# Patient Record
Sex: MALE | Race: WHITE | Employment: FULL TIME | URBAN - NONMETROPOLITAN AREA
[De-identification: names, ages, dates, MRNs, and addresses within clinical notes are randomized per-mention and may not be internally consistent; named-entity substitution may affect disease eponyms.]

---

## 2018-02-05 ENCOUNTER — COMMUNICATION - GICH (OUTPATIENT)
Dept: FAMILY MEDICINE | Facility: OTHER | Age: 33
End: 2018-02-05

## 2018-02-05 DIAGNOSIS — Z20.828 CONTACT WITH AND (SUSPECTED) EXPOSURE TO OTHER VIRAL COMMUNICABLE DISEASES: ICD-10-CM

## 2018-02-13 NOTE — TELEPHONE ENCOUNTER
Patient Information     Patient Name MRN Sex Willy Velazquez [id number] Male 1985      Telephone Encounter by Alayna Caldwell MD at 2018  4:56 PM     Author:  Alayna Caldwell MD Service:  (none) Author Type:  Physician     Filed:  2018  4:57 PM Encounter Date:  2018 Status:  Signed     :  Alayna Caldwell MD (Physician)            Daughter with influenza A.  proph tamiflu sent to [facility] for dad.  Alayna Caldwell MD ....................  2018   4:57 PM

## 2018-02-19 ENCOUNTER — DOCUMENTATION ONLY (OUTPATIENT)
Dept: FAMILY MEDICINE | Facility: OTHER | Age: 33
End: 2018-02-19

## 2019-09-03 ENCOUNTER — OFFICE VISIT (OUTPATIENT)
Dept: FAMILY MEDICINE | Facility: OTHER | Age: 34
End: 2019-09-03
Attending: PHYSICIAN ASSISTANT
Payer: COMMERCIAL

## 2019-09-03 VITALS
DIASTOLIC BLOOD PRESSURE: 64 MMHG | HEART RATE: 63 BPM | TEMPERATURE: 98.5 F | HEIGHT: 67 IN | WEIGHT: 201.1 LBS | RESPIRATION RATE: 16 BRPM | BODY MASS INDEX: 31.56 KG/M2 | OXYGEN SATURATION: 94 % | SYSTOLIC BLOOD PRESSURE: 110 MMHG

## 2019-09-03 DIAGNOSIS — L08.9 ABRASION, LEG W/ INFECTION, RIGHT, INITIAL ENCOUNTER: Primary | ICD-10-CM

## 2019-09-03 DIAGNOSIS — S80.811A ABRASION, LEG W/ INFECTION, RIGHT, INITIAL ENCOUNTER: Primary | ICD-10-CM

## 2019-09-03 PROCEDURE — 90471 IMMUNIZATION ADMIN: CPT | Performed by: PHYSICIAN ASSISTANT

## 2019-09-03 PROCEDURE — 90715 TDAP VACCINE 7 YRS/> IM: CPT | Performed by: PHYSICIAN ASSISTANT

## 2019-09-03 PROCEDURE — 99214 OFFICE O/P EST MOD 30 MIN: CPT | Mod: 25 | Performed by: PHYSICIAN ASSISTANT

## 2019-09-03 RX ORDER — CEPHALEXIN 500 MG/1
1000 CAPSULE ORAL 2 TIMES DAILY
Qty: 28 CAPSULE | Refills: 0 | Status: SHIPPED | OUTPATIENT
Start: 2019-09-03 | End: 2019-09-16

## 2019-09-03 RX ORDER — MUPIROCIN 20 MG/G
OINTMENT TOPICAL 3 TIMES DAILY
Qty: 15 G | Refills: 0 | Status: SHIPPED | OUTPATIENT
Start: 2019-09-03 | End: 2019-09-16

## 2019-09-03 ASSESSMENT — PAIN SCALES - GENERAL: PAINLEVEL: SEVERE PAIN (6)

## 2019-09-03 ASSESSMENT — MIFFLIN-ST. JEOR: SCORE: 1820.81

## 2019-09-03 NOTE — PROGRESS NOTES
"HPI:    Willy Courtney is a 34 year old male who presents to clinic today for evaluation of an abrasion to his right anterior leg  He was playing kick ball and he slid into second base causing a large abrasion on his anterior right leg. The wound is tender and has been applying antibiotic ointment daily. [place]   Onset 4 days ago, course is worse in past 24 hours.   Associated symptoms: swelling, redness, pain 6/10, warmth  He is able to ambulate without limping.       History reviewed. No pertinent past medical history.      No current outpatient medications on file.       Allergies   Allergen Reactions     Amoxicillin Unknown     Patient is unable to remember       ROS:  ROS  General: feels well, no fever  Musculoskeletal: injury per HPI      EXAM:  Vitals:    09/03/19 1052   BP: 110/64   BP Location: Left arm   Patient Position: Sitting   Cuff Size: Adult Regular   Pulse: 63   Resp: 16   Temp: 98.5  F (36.9  C)   TempSrc: Tympanic   SpO2: 94%   Weight: 91.2 kg (201 lb 1.6 oz)   Height: 1.702 m (5' 7\")     General appearance: well appearing male, in no acute distress  Musculoskeletal: Abrasion to right anterior leg measures 18 cm tall x 6 cm wide. There is 1 cm surrounding erythema, the leg is mildly swollen and warm when compared to left leg. Abraision is scabbed over, is dry with no drainage. No noted foreign body in wound.   Psychological: normal affect, alert and pleasant      ASSESSMENT AND PLAN:    1. Abrasion, leg w/ infection, right, initial encounter      Tetanus updated today  Abrasion to right leg with infection  Wash daily with warm soapy water and apply topical antibiotic ointment 3 times daily.   Start cephalexin 500 mg oral tablet, take 2 tablets twice daily for 7 days.   Do not soak in tub, lake, pool. Can shower per normal.   Return to clinic is symptoms persist or worsen  Patient received verbal and written instruction including review of warning " signs    Tyaler Garcia PA-C on 9/3/2019 at 1:01 PM

## 2019-09-03 NOTE — PATIENT INSTRUCTIONS
Abrasion to right leg with infection  Wash daily with warm soapy water and apply topical antibiotic ointment 3 times daily.   Start cephalexin 500 mg oral tablet, take 2 tablets twice daily for 7 days.   Follow up in clinic for rapid increase in redness, fever > 100.4, increased pain or no improvement after 48-72 hours.       Patient Education     Abrasions  Abrasions are skin scrapes. Their treatment depends on how large and deep the abrasion is.  Home care  You may be prescribed an antibiotic cream or ointment to apply to the wound. This helps prevent infection. Follow instructions when using this medicine.  General care    To care for the abrasion, do the following each day for as long as directed by your healthcare provider.  ? If you were given a bandage, change it once a day. If your bandage sticks to the wound, soak it in warm water until it loosens.  ? Wash the area with soap and warm water. You may do this in a sink or under a tub faucet or shower. Rinse off the soap. Then pat the area dry with a clean towel.  ? If antibiotic ointment or cream was prescribed, reapply it to the wound as directed. Cover the wound with a fresh nonstick bandage. If the bandage becomes wet or dirty, change it as soon as possible.  ? Some antibiotic ointments or cream can cause an allergic reaction or dermatitis. This may cause redness, itching and or hives. If this occurs, stop using the ointment immediately and wash off any remaining ointment. You may need to take some allergy medicine to relieve symptoms.    You may use acetaminophen or ibuprofen to control pain unless another pain medicine was prescribed. Talk with your healthcare provider before using these medicines if you have chronic liver or kidney disease or ever had a stomach ulcer or GI bleeding. Don t use ibuprofen in children younger than six months old.    Most skin wounds heal within 10 days. But an infection may occur even with treatment. So it s important to  watch the wound for signs of infection as listed below.  Follow-up care  Follow up with your healthcare provider, or as advised.  When to seek medical advice  Call your healthcare provider right away if any of these occur:    Fever of 100.4 F (38 C) or higher, or as directed by your healthcare provider    Increasing pain, redness, swelling, or drainage from the wound    Bleeding from the wound that does not stop after a few minutes of steady, firm pressure    Decreased ability to move any body part near the wound  Date Last Reviewed: 3/3/2017    0565-3270 [employer]. [address] [place], [place] [id number]. All rights reserved. This information is not intended as a substitute for professional medical care. Always follow your healthcare professional's instructions.

## 2019-09-03 NOTE — NURSING NOTE
"Chief Complaint   Patient presents with     Leg Pain     Pt states he slid into 2nd base on saturday, scraping his right calf.       Initial /64 (BP Location: Left arm, Patient Position: Sitting, Cuff Size: Adult Regular)   Pulse 63   Temp 98.5  F (36.9  C) (Tympanic)   Resp 16   Ht 1.702 m (5' 7\")   Wt 91.2 kg (201 lb 1.6 oz)   SpO2 94%   BMI 31.50 kg/m   Estimated body mass index is 31.5 kg/m  as calculated from the following:    Height as of this encounter: 1.702 m (5' 7\").    Weight as of this encounter: 91.2 kg (201 lb 1.6 oz).  Medication Reconciliation: complete    Jessica Aguilera LPN on 9/3/2019 at 10:55 AM    "

## 2019-09-16 ENCOUNTER — OFFICE VISIT (OUTPATIENT)
Dept: FAMILY MEDICINE | Facility: OTHER | Age: 34
End: 2019-09-16
Attending: NURSE PRACTITIONER
Payer: COMMERCIAL

## 2019-09-16 VITALS
BODY MASS INDEX: 31.88 KG/M2 | SYSTOLIC BLOOD PRESSURE: 126 MMHG | OXYGEN SATURATION: 97 % | DIASTOLIC BLOOD PRESSURE: 82 MMHG | RESPIRATION RATE: 16 BRPM | WEIGHT: 203.1 LBS | HEART RATE: 58 BPM | HEIGHT: 67 IN | TEMPERATURE: 97.7 F

## 2019-09-16 DIAGNOSIS — T63.441A BEE STING REACTION, ACCIDENTAL OR UNINTENTIONAL, INITIAL ENCOUNTER: Primary | ICD-10-CM

## 2019-09-16 PROCEDURE — 25000128 H RX IP 250 OP 636: Performed by: PHYSICIAN ASSISTANT

## 2019-09-16 PROCEDURE — 96372 THER/PROPH/DIAG INJ SC/IM: CPT

## 2019-09-16 PROCEDURE — 99214 OFFICE O/P EST MOD 30 MIN: CPT | Performed by: PHYSICIAN ASSISTANT

## 2019-09-16 RX ORDER — TRIAMCINOLONE ACETONIDE 40 MG/ML
60 INJECTION, SUSPENSION INTRA-ARTICULAR; INTRAMUSCULAR ONCE
Status: COMPLETED | OUTPATIENT
Start: 2019-09-16 | End: 2019-09-16

## 2019-09-16 RX ORDER — PREDNISONE 20 MG/1
40 TABLET ORAL DAILY
Qty: 8 TABLET | Refills: 0 | Status: SHIPPED | OUTPATIENT
Start: 2019-09-17 | End: 2019-09-21

## 2019-09-16 RX ADMIN — TRIAMCINOLONE ACETONIDE 60 MG: 40 INJECTION, SUSPENSION INTRA-ARTICULAR; INTRAMUSCULAR at 13:04

## 2019-09-16 ASSESSMENT — MIFFLIN-ST. JEOR: SCORE: 1829.89

## 2019-09-16 ASSESSMENT — PAIN SCALES - GENERAL: PAINLEVEL: MILD PAIN (2)

## 2019-09-16 NOTE — PATIENT INSTRUCTIONS
Bee sting with large local reaction on right hand  Kenalog 60 mg IM given in clinic  Start prednisone 20 mg oral tablet, take 2 tablets in AM with food for 4 days. Start tomorrow  Symptomatic treatments   Rest hand, elevate, apply ice 10-20 minutes every 1-2 hours  OTC benadryl 25-50 mg every 4-6 hours, max 300 mg/day alternately you can use cetirizine 10 mg oral tablet take 1-2 tablets daily.   For itching - apply ice, baking soda paste to bee sting site, calamine lotion  Apply topical antibiotic ointment 3 times daily for 7-10 days  Return to clinic for rapid change in symptoms - erythema, swelling, redness, fever > 100.4  Seek immediate care for    Spreading areas of itching, redness or swelling    Headache, fever, chills, muscle or joint aching    Increased pain or swelling    Signs of infection of the affected area:  ? Spreading redness  ? Increase in pain or swelling  ? Fluid or colored drainage from the affected site    Patient Education     Insect Sting Allergy, Generalized  You are having an allergic reaction to an insect sting. This may occur after a sting by a wasp, honeybee, yellow jacket, or other insect. This may cause an itchy rash and swelling in the face or other parts of the body. A more severe reaction may cause you to feel dizzy, faint, or have trouble breathing or swallowing. Other warning signs are listed below.  Symptoms can include:    Rash, hives, redness, welts, or blisters in areas other than the sting site    Itching, burning, stinging, pain in areas other than the sting site    Dry, flaky, cracking, scaly skin    Swelling in areas other than the sting site     Stomach pain or cramps  More severe symptoms include:    Swelling of the face or lips or drooling    Trouble swallowing, feeling like your throat is closing    Trouble breathing, wheezing    Dizziness or a sudden decrease in blood pressure    Hoarse voice or trouble speaking    Severe nausea, vomiting, or diarrhea    Feeling faint  or lightheaded    Rapid heart rate  Home care  Medicine  The healthcare provider may prescribe medicines to relieve swelling, itching, and pain. Follow the provider s instructions when taking these medicines.    If you had a severe reaction, the provider may prescribe an injectable epinephrine kit. Epinephrine will stop the progression of an allergic reaction. Before you leave the hospital, be sure that you understand when and how to use this medicine.    Oral diphenhydramine is an over-the-counter antihistamine available at pharmacies and grocery stores. Unless a prescription antihistamine was given, diphenhydramine may be used to reduce itching if large areas of the skin are involved. It may make you sleepy, so be careful using it in the daytime or when going to school, working, or driving. Note: Don t use diphenhydramine if you have glaucoma or if you are a man with trouble urinating due to an enlarged prostate. There are other antihistamines that cause less drowsiness and are good choices for daytime use. Ask your pharmacist for suggestions.    Don t use diphenhydramine cream on your skin. It can cause a further reaction in some people.    Calamine lotion or oatmeal baths sometimes help with itching.    You may use acetaminophen or ibuprofen to control pain, unless another pain medicine was prescribed. Note: If you have chronic liver or kidney disease or ever had a stomach ulcer or gastrointestinal bleeding, talk with your provider before using these medicines.    General care  Avoid tight clothing and things that heat up your skin (such as hot showers or baths, or direct sunlight). Heat makes the itching worse.  An ice pack will relieve local areas of intense itching and redness. Apply 5 to 10 minutes. To make an ice pack, put ice cubes in a plastic bag that seals at the top. Wrap the bag in a clean, thin towel or cloth. Don t put ice directly on the skin.  Ticks  If you try to remove a tick, do the  following:    Use a set of fine tweezers and  the tick as close to the skin as is possible.    Pull upwards, using even, steady pressure. Don t jerk or twist the tick. The tick s bodily fluids may contain infection-causing organisms. So don t squeeze, crush, or puncture the body of the tick. Don t use a smoldering match or cigarette, nail polish, petroleum jelly, liquid soap, or kerosene. They may irritate the tick.    If any mouthparts of the tick remain in the skin, these can be removed with tweezers. If you can t remove the mouth (of a tick) easily with clean tweezers, leave it alone and let the skin heal.    After the tick is removed, wash the bite area with rubbing alcohol, iodine, or soap and water.    Put the tick in a sealed container and completely cover it with alcohol. Never try to kill or crush a tick with your hand or fingers.  Stings  Wasps, yellow jackets, and hornets don t leave a stinger behind. But if a honeybee stings you, a stinger may stay in your skin. The stinger of a honeybee releases a substance that will attract other bees to you. So try to move away from the nest immediately. Once you are away from the nest, then remove the stinger as quickly as possible by:    Scraping the stinger out with the edge of a dull knife or plastic card (credit card).    Don't use a tweezer or your fingers to remove the stinger since that may squeeze more toxin from the stinger.    Wash the affected area with soap and warm water 2 to 3 times a day. Don't break a blister, if present.    Next apply an ice pack for 5 to 10 minutes. To make an ice pack, put ice cubes in a plastic bag that seals at the top. Wrap the bag in a clean, thin towel or cloth. Don t put ice directly on the skin.    Contact your healthcare provider and ask what can be used to help decrease the swelling and itching to the affected area.     To prevent an infection, don't scratch the affected areas. Always check the sting area for signs of  an infection: increased redness, swelling, or pain to the affected area.  Preventing future reactions  Future reactions could be worse than this one. So try to avoid situations where you might be stung:    Don't walk in grass without shoes. Avoid wearing sandals.    Don't leave food uncovered when eating outside. Sweet treats, watermelon, and ice cream attract insects.    Don't drink from uncovered sweetened drinks in cans when outside. Insects are attracted to soda drink cans and sometimes crawl inside of them.    Don't wear bright colored clothes with flowery prints and patterns when outside.    Don t wear perfume when outside. Smell attracts insects.    Wear long pants, long-sleeved shirts, socks, and work gloves when working outside.    Be aware that honeybees nest in trees. Wasps and yellow jackets nest in the ground, trees or roof eaves. Avoid garbage cans when outside.  Auto-injectable epinephrine    If you are at high risk for another sting due to where you work or play, or if your reaction included dizziness, fainting or trouble breathing or swallowing, an auto-injectable epinephrine may be prescribed. If not, ask your healthcare provider for one and always carry it with you. Learn how to use the device. If you begin to feel the symptoms of another reaction in the future, use the auto-injectable epinephrine to inject yourself, and then call 911. Don't wait until symptoms become severe.     Remember that the auto-injectable epinephrine is a rescue medicine only. You still need someone to take you to the hospital or call 911 after you have received the medicine.  Follow-up care  Follow up with your healthcare provider, or as advised if your symptoms do not continue to improve.  Call 911  Call 911 if any of these occur:    Trouble breathing or swallowing, wheezing     Cool, moist, pale skin    Hoarse voice or trouble speaking    Confused    Very drowsy or trouble waking up    Fainting or loss of  consciousness    Rapid heart rate    Low blood pressure or feeling dizzy or weak    Feeling of doom    Severe nausea, vomiting, or diarrhea    Seizure    Swelling in the face, eyelids, lips, mouth, throat or tongue    Drooling  When to seek medical advice  Call your healthcare provider right away if any of the following occur:    Spreading areas of itching, redness or swelling    Headache, fever, chills, muscle or joint aching    Increased pain or swelling    Signs of infection of the affected area:  ? Spreading redness  ? Increase in pain or swelling  ? Fluid or colored drainage from the affected site  Date Last Reviewed: 3/1/2017    5895-1884 [employer]. [address] [place] [address]. All rights reserved. This information is not intended as a substitute for professional medical care. Always follow your healthcare professional's instructions.

## 2019-09-16 NOTE — PROGRESS NOTES
"HPI:    Willy Courtney is a 34 year old male who presents to clinic today for evaluation of a bee sting to his right hand  Onset yesterday afternoon, course is gradually worsening  Associated symptoms: bee sting between 2/3 fingers on right hand, erythema, swelling, mild warmth over 2/3 fingers and dorsal hand    Treatments ice, benadryl 25 mg every 4 hours, he has taken about 6 doses. Last dose was 4 hours PTA    No facial swelling, difficulty with swallowing, no chest tightness, wheezing, shortness of breath or difficulty with breathing  No fever, no headache, is otherwise feeling well    History reviewed. No pertinent past medical history.      No current outpatient medications on file.       Allergies   Allergen Reactions     Amoxicillin Unknown     Patient is unable to remember     Bactroban [Mupirocin]        ROS:  General: feels well, no fever  Skin: POSITIVE bee sting  HENT: negative   Respiratory: negative  Abdomen: negative  Musculoskleletal: negative  Neurological: negative    EXAM:  Vitals:    09/16/19 1231   BP: 126/82   BP Location: Left arm   Patient Position: Sitting   Cuff Size: Adult Regular   Pulse: 58   Resp: 16   Temp: 97.7  F (36.5  C)   TempSrc: Tympanic   SpO2: 97%   Weight: 92.1 kg (203 lb 1.6 oz)   Height: 1.702 m (5' 7\")     General appearance: well appearing male, in no acute distress  Head: normocephalic, atraumatic  Ears: ear canals normal  Eyes: conjunctivae normal  Orophayrnx: no swelling, moist mucous membranes, no erythema  Neck: supple without adenopathy  Respiratory: normal respiration  Cardiac: RRR with no murmurs  Musculoskeletal: right hand has bee sting noted between 2/3 fingers, erythema over prox phalanx of both fingers and over dorsal hand, with swelling. Mild warmth.   Dermatological: no other rash  Psychological: normal affect, alert and pleasant    ASSESSMENT AND PLAN:    1. Bee sting reaction, accidental or unintentional, initial encounter      Bee sting with large local " reaction on right hand  Kenalog 60 mg IM given in clinic  Start prednisone 20 mg oral tablet, take 2 tablets in AM with food for 4 days. Start tomorrow  Symptomatic treatments   Rest hand, elevate, apply ice 10-20 minutes every 1-2 hours  OTC benadryl 25-50 mg every 4-6 hours, max 300 mg/day alternately you can use cetirizine 10 mg oral tablet take 1-2 tablets daily.   For itching - apply ice, baking soda paste to bee sting site, calamine lotion  Apply topical antibiotic ointment 3 times daily for 7-10 days  Return to clinic for rapid change in symptoms - erythema, swelling, redness, fever > 100.4  Patient received verbal and written instruction including review of warning signs    Tayler Garcia PA-C on 9/16/2019 at 2:21 PM

## 2019-09-16 NOTE — NURSING NOTE
"Chief Complaint   Patient presents with     Insect Bites     Pt states he was stung by a bee yesterday in the right hand and has been taking benadryl Q4H and can't get the swelling to go down.       Initial /82 (BP Location: Left arm, Patient Position: Sitting, Cuff Size: Adult Regular)   Pulse 58   Temp 97.7  F (36.5  C) (Tympanic)   Resp 16   Ht 1.702 m (5' 7\")   Wt 92.1 kg (203 lb 1.6 oz)   SpO2 97%   BMI 31.81 kg/m   Estimated body mass index is 31.81 kg/m  as calculated from the following:    Height as of this encounter: 1.702 m (5' 7\").    Weight as of this encounter: 92.1 kg (203 lb 1.6 oz).  Medication Reconciliation: complete    Jessica Aguilera LPN on 9/16/2019 at 12:34 PM    "

## 2024-01-02 ENCOUNTER — OFFICE VISIT (OUTPATIENT)
Dept: FAMILY MEDICINE | Facility: OTHER | Age: 39
End: 2024-01-02
Payer: COMMERCIAL

## 2024-01-02 VITALS
RESPIRATION RATE: 14 BRPM | BODY MASS INDEX: 29.24 KG/M2 | TEMPERATURE: 97.9 F | HEART RATE: 67 BPM | DIASTOLIC BLOOD PRESSURE: 72 MMHG | OXYGEN SATURATION: 98 % | WEIGHT: 186.3 LBS | HEIGHT: 67 IN | SYSTOLIC BLOOD PRESSURE: 118 MMHG

## 2024-01-02 DIAGNOSIS — J02.9 SORE THROAT: Primary | ICD-10-CM

## 2024-01-02 DIAGNOSIS — J02.0 STREPTOCOCCAL PHARYNGITIS: ICD-10-CM

## 2024-01-02 LAB — GROUP A STREP BY PCR: DETECTED

## 2024-01-02 PROCEDURE — 99213 OFFICE O/P EST LOW 20 MIN: CPT | Performed by: NURSE PRACTITIONER

## 2024-01-02 PROCEDURE — 87651 STREP A DNA AMP PROBE: CPT | Mod: ZL | Performed by: NURSE PRACTITIONER

## 2024-01-02 RX ORDER — CEPHALEXIN 500 MG/1
500 CAPSULE ORAL 2 TIMES DAILY
Qty: 20 CAPSULE | Refills: 0 | Status: SHIPPED | OUTPATIENT
Start: 2024-01-02 | End: 2024-01-12

## 2024-01-02 ASSESSMENT — PAIN SCALES - GENERAL: PAINLEVEL: SEVERE PAIN (7)

## 2024-01-02 NOTE — NURSING NOTE
"Chief Complaint   Patient presents with    Pharyngitis     Symptoms started Saturday; negative covid test at home; taking Ibuprofen and Tylenol     Generalized Body Aches    Headache       Initial /72   Pulse 67   Temp 97.9  F (36.6  C) (Tympanic)   Resp 14   Ht 1.708 m (5' 7.25\")   Wt 84.5 kg (186 lb 4.8 oz)   SpO2 98%   BMI 28.96 kg/m   Estimated body mass index is 28.96 kg/m  as calculated from the following:    Height as of this encounter: 1.708 m (5' 7.25\").    Weight as of this encounter: 84.5 kg (186 lb 4.8 oz).  Medication Review: complete    The next two questions are to help us understand your food security.  If you are feeling you need any assistance in this area, we have resources available to support you today.          1/2/2024   SDOH- Food Insecurity   Within the past 12 months, did you worry that your food would run out before you got money to buy more? N   Within the past 12 months, did the food you bought just not last and you didn t have money to get more? N         Health Care Directive:  Patient does not have a Health Care Directive or Living Will: Discussed advance care planning with patient; however, patient declined at this time.    Radha Gamez CMA      "

## 2024-01-02 NOTE — PROGRESS NOTES
ASSESSMENT/PLAN:    I have reviewed the nursing notes.  I have reviewed the findings, diagnosis, plan and need for follow up with the patient.    1. Sore throat  - Group A Streptococcus PCR Throat Swab  Positive strep test.  Treating with cephalexin as patient has an allergy to amoxicillin.  Recommend new toothbrush in 2 days prevent reinfection and take full course of antibiotic as it was prescribed.    2. Streptococcal pharyngitis  - cephALEXin (KEFLEX) 500 MG capsule; Take 1 capsule (500 mg) by mouth 2 times daily for 10 days  Dispense: 20 capsule; Refill: 0    - Symptomatic treatment - Encouraged fluids, salt water gargles, honey (only if greater than 1 year in age due to risk of botulism), elevation, humidifier, sinus rinse/netti pot, lozenges, tea, topical vapor rub, popsicles, rest, etc   -May use over-the-counter Tylenol or ibuprofen PRN    Follow up if symptoms persist or worsen or concerns    I explained my diagnostic considerations and recommendations to the patient, who voiced understanding and agreement with the treatment plan. All questions were answered. We discussed potential side effects of any prescribed or recommended therapies, as well as expectations for response to treatments.    Radha Pastrana NP  1/2/2024  10:26 AM    HPI:  Willy Courtney is a 38 year old male who [facility] for concerns of sore throat, negative home covid test. Taking ibuprofen and tylenol. Generalized body aches, and headache also present. Throat still really sore but no headaches anymore. Had fever/chills in the night. Wife sick too. Had white stuff in back of throat. No shortness of breath. Saturday night is when symptoms started.     No past medical history on file.  No past surgical history on file.  Social History     Tobacco Use    Smoking status: Never    Smokeless tobacco: Former     Types: Chew     Quit date: 1/1/2011   Substance Use Topics    Alcohol use: Yes     Comment: Alcoholic Drinks/day:  "moderate     Current Outpatient Medications   Medication Sig Dispense Refill    cephALEXin (KEFLEX) 500 MG capsule Take 1 capsule (500 mg) by mouth 2 times daily for 10 days 20 capsule 0     Allergies   Allergen Reactions    Amoxicillin Unknown     Patient is unable to remember    Bactroban [Mupirocin]      Past medical history, past surgical history, current medications and allergies reviewed and accurate to the best of my knowledge.      ROS:  Refer to HPI    /72   Pulse 67   Temp 97.9  F (36.6  C) (Tympanic)   Resp 14   Ht 1.708 m (5' 7.25\")   Wt 84.5 kg (186 lb 4.8 oz)   SpO2 98%   BMI 28.96 kg/m      EXAM:  General Appearance: Well appearing 38 year old male, appropriate appearance for age. No acute distress   Ears: Left TM intact, translucent with bony landmarks appreciated, no erythema, no effusion, no bulging, no purulence.  Right TM intact, translucent with bony landmarks appreciated, no erythema, no effusion, no bulging, no purulence.  Left auditory canal clear.  Right auditory canal clear.  Normal external ears, non tender.  Eyes: conjunctivae normal without erythema or irritation, corneas clear, no drainage or crusting, no eyelid swelling, pupils equal   Oropharynx: moist mucous membranes, + posterior pharynx with erythema, tonsils 2+, no erythema, + bilateral tonsillar exudates, no post nasal drip seen, no trismus, voice clear.    Nose:  Bilateral nares: no erythema, no edema, no drainage or congestion   Neck: supple without adenopathy  Respiratory: normal chest wall and respirations.  Normal effort.  Clear to auscultation bilaterally, no wheezing, crackles or rhonchi.  No increased work of breathing.  No cough appreciated.  Cardiac: RRR with no murmurs  Musculoskeletal:  Equal movement of bilateral upper extremities.  Equal movement of bilateral lower extremities.  Normal gait.    Neuro: Alert and oriented to person, place, and time.    Psychological: normal affect, alert, oriented, and " pleasant.     Results for orders placed or performed in visit on 01/02/24   Group A Streptococcus PCR Throat Swab     Status: Abnormal    Specimen: Throat; Swab   Result Value Ref Range    Group A strep by PCR Detected (A) Not Detected    Narrative    The Xpert Xpress Strep A test, performed on the [employer], is a rapid, qualitative in vitro diagnostic test for the detection of Streptococcus pyogenes (Group A ß-hemolytic Streptococcus, Strep A) in throat swab specimens from patients with signs and symptoms of pharyngitis. The Xpert Xpress Strep A test can be used as an aid in the diagnosis of Group A Streptococcal pharyngitis. The assay is not intended to monitor treatment for Group A Streptococcus infections. The Xpert Xpress Strep A test utilizes an automated real-time polymerase chain reaction (PCR) to detect Streptococcus pyogenes DNA.

## 2024-01-02 NOTE — LETTER
January 2, 2024      Willy Courtney  [address] [address]        To Whom It May Concern:    Willy BRUNNER Roz was seen on 1/2/2024.          Sincerely,        Radha Pastrana NP

## 2024-08-12 ENCOUNTER — OFFICE VISIT (OUTPATIENT)
Dept: FAMILY MEDICINE | Facility: OTHER | Age: 39
End: 2024-08-12
Payer: COMMERCIAL

## 2024-08-12 VITALS
BODY MASS INDEX: 28.66 KG/M2 | TEMPERATURE: 98.6 F | SYSTOLIC BLOOD PRESSURE: 122 MMHG | HEIGHT: 67 IN | WEIGHT: 182.6 LBS | RESPIRATION RATE: 19 BRPM | HEART RATE: 96 BPM | OXYGEN SATURATION: 96 % | DIASTOLIC BLOOD PRESSURE: 70 MMHG

## 2024-08-12 DIAGNOSIS — R04.0 ACUTE POSTERIOR EPISTAXIS: Primary | ICD-10-CM

## 2024-08-12 DIAGNOSIS — Z79.2 PROPHYLACTIC ANTIBIOTIC: ICD-10-CM

## 2024-08-12 DIAGNOSIS — R11.0 NAUSEA: ICD-10-CM

## 2024-08-12 PROCEDURE — 250N000011 HC RX IP 250 OP 636: Performed by: NURSE PRACTITIONER

## 2024-08-12 PROCEDURE — 30901 CONTROL OF NOSEBLEED: CPT | Performed by: NURSE PRACTITIONER

## 2024-08-12 PROCEDURE — 99213 OFFICE O/P EST LOW 20 MIN: CPT | Mod: 25 | Performed by: NURSE PRACTITIONER

## 2024-08-12 PROCEDURE — 250N000009 HC RX 250: Performed by: NURSE PRACTITIONER

## 2024-08-12 RX ORDER — CEPHALEXIN 500 MG/1
500 CAPSULE ORAL 2 TIMES DAILY
Qty: 10 CAPSULE | Refills: 0 | Status: SHIPPED | OUTPATIENT
Start: 2024-08-12 | End: 2024-08-17

## 2024-08-12 RX ORDER — OXYMETAZOLINE HYDROCHLORIDE 0.05 G/100ML
2 SPRAY NASAL ONCE
Status: DISCONTINUED | OUTPATIENT
Start: 2024-08-12 | End: 2024-08-12 | Stop reason: ALTCHOICE

## 2024-08-12 RX ORDER — ONDANSETRON 4 MG/1
4 TABLET, ORALLY DISINTEGRATING ORAL ONCE
Status: COMPLETED | OUTPATIENT
Start: 2024-08-12 | End: 2024-08-12

## 2024-08-12 RX ADMIN — Medication 2 SPRAY: at 11:49

## 2024-08-12 RX ADMIN — ONDANSETRON 4 MG: 4 TABLET, ORALLY DISINTEGRATING ORAL at 11:37

## 2024-08-12 ASSESSMENT — PAIN SCALES - GENERAL: PAINLEVEL: NO PAIN (0)

## 2024-08-12 NOTE — NURSING NOTE
"Chief Complaint   Patient presents with    Nose Bleeds     X1 day, left nostril     Patient here for a nose bled out of left that started yesterday. He notes that they come every 2 hours. He is nauseous due to drainage in throat. No injury noted.       Initial /70   Pulse 96   Temp 98.6  F (37  C) (Temporal)   Resp 19   Ht 1.702 m (5' 7\")   Wt 82.8 kg (182 lb 9.6 oz)   SpO2 96%   BMI 28.60 kg/m   Estimated body mass index is 28.6 kg/m  as calculated from the following:    Height as of this encounter: 1.702 m (5' 7\").    Weight as of this encounter: 82.8 kg (182 lb 9.6 oz).  Medication Review: complete    The next two questions are to help us understand your food security.  If you are feeling you need any assistance in this area, we have resources available to support you today.          8/12/2024   SDOH- Food Insecurity   Within the past 12 months, did you worry that your food would run out before you got money to buy more? N   Within the past 12 months, did the food you bought just not last and you didn t have money to get more? N            Health Care Directive:  Patient does not have a Health Care Directive or Living Will: Discussed advance care planning with patient; however, patient declined at this time.    Mckayla Johnston LPN      "

## 2024-08-13 ENCOUNTER — HOSPITAL ENCOUNTER (EMERGENCY)
Facility: OTHER | Age: 39
Discharge: HOME | End: 2024-08-14
Payer: COMMERCIAL

## 2024-08-13 ENCOUNTER — HOSPITAL ENCOUNTER (EMERGENCY)
Facility: OTHER | Age: 39
Discharge: LEFT BEFORE DISCHARGE | End: 2024-08-13
Payer: COMMERCIAL

## 2024-08-13 ENCOUNTER — TELEPHONE (OUTPATIENT)
Dept: FAMILY MEDICINE | Facility: OTHER | Age: 39
End: 2024-08-13
Payer: COMMERCIAL

## 2024-08-13 VITALS
TEMPERATURE: 98.1 F | HEIGHT: 67 IN | WEIGHT: 182 LBS | SYSTOLIC BLOOD PRESSURE: 128 MMHG | RESPIRATION RATE: 20 BRPM | HEART RATE: 89 BPM | DIASTOLIC BLOOD PRESSURE: 93 MMHG | OXYGEN SATURATION: 99 % | BODY MASS INDEX: 28.56 KG/M2

## 2024-08-13 VITALS
TEMPERATURE: 98.2 F | DIASTOLIC BLOOD PRESSURE: 74 MMHG | HEIGHT: 67 IN | BODY MASS INDEX: 28.56 KG/M2 | WEIGHT: 182 LBS | HEART RATE: 75 BPM | SYSTOLIC BLOOD PRESSURE: 133 MMHG | OXYGEN SATURATION: 97 % | RESPIRATION RATE: 20 BRPM

## 2024-08-13 DIAGNOSIS — R04.0 EPISTAXIS: ICD-10-CM

## 2024-08-13 LAB
ANION GAP SERPL CALCULATED.3IONS-SCNC: 8 MMOL/L (ref 7–15)
APTT PPP: 23 SECONDS (ref 22–38)
BASOPHILS # BLD AUTO: 0 10E3/UL (ref 0–0.2)
BASOPHILS NFR BLD AUTO: 0 %
BUN SERPL-MCNC: 12.1 MG/DL (ref 6–20)
CALCIUM SERPL-MCNC: 9.4 MG/DL (ref 8.8–10.4)
CHLORIDE SERPL-SCNC: 103 MMOL/L (ref 98–107)
CREAT SERPL-MCNC: 0.78 MG/DL (ref 0.67–1.17)
EGFRCR SERPLBLD CKD-EPI 2021: >90 ML/MIN/1.73M2
EOSINOPHIL # BLD AUTO: 0 10E3/UL (ref 0–0.7)
EOSINOPHIL NFR BLD AUTO: 0 %
ERYTHROCYTE [DISTWIDTH] IN BLOOD BY AUTOMATED COUNT: 12.8 % (ref 10–15)
GLUCOSE SERPL-MCNC: 117 MG/DL (ref 70–99)
HCO3 SERPL-SCNC: 28 MMOL/L (ref 22–29)
HCT VFR BLD AUTO: 35.6 % (ref 40–53)
HGB BLD-MCNC: 11.9 G/DL (ref 13.3–17.7)
IMM GRANULOCYTES # BLD: 0 10E3/UL
IMM GRANULOCYTES NFR BLD: 0 %
INR PPP: 1.03 (ref 0.85–1.15)
LYMPHOCYTES # BLD AUTO: 1.8 10E3/UL (ref 0.8–5.3)
LYMPHOCYTES NFR BLD AUTO: 25 %
MCH RBC QN AUTO: 32 PG (ref 26.5–33)
MCHC RBC AUTO-ENTMCNC: 33.4 G/DL (ref 31.5–36.5)
MCV RBC AUTO: 96 FL (ref 78–100)
MONOCYTES # BLD AUTO: 0.5 10E3/UL (ref 0–1.3)
MONOCYTES NFR BLD AUTO: 7 %
NEUTROPHILS # BLD AUTO: 4.8 10E3/UL (ref 1.6–8.3)
NEUTROPHILS NFR BLD AUTO: 68 %
NRBC # BLD AUTO: 0 10E3/UL
NRBC BLD AUTO-RTO: 0 /100
PLATELET # BLD AUTO: 231 10E3/UL (ref 150–450)
POTASSIUM SERPL-SCNC: 4 MMOL/L (ref 3.4–5.3)
RBC # BLD AUTO: 3.72 10E6/UL (ref 4.4–5.9)
SODIUM SERPL-SCNC: 139 MMOL/L (ref 135–145)
WBC # BLD AUTO: 7.1 10E3/UL (ref 4–11)

## 2024-08-13 PROCEDURE — 85730 THROMBOPLASTIN TIME PARTIAL: CPT

## 2024-08-13 PROCEDURE — 85025 COMPLETE CBC W/AUTO DIFF WBC: CPT

## 2024-08-13 PROCEDURE — 85610 PROTHROMBIN TIME: CPT

## 2024-08-13 PROCEDURE — 99283 EMERGENCY DEPT VISIT LOW MDM: CPT

## 2024-08-13 PROCEDURE — 80048 BASIC METABOLIC PNL TOTAL CA: CPT

## 2024-08-13 PROCEDURE — 250N000013 HC RX MED GY IP 250 OP 250 PS 637

## 2024-08-13 PROCEDURE — 250N000009 HC RX 250

## 2024-08-13 PROCEDURE — 36415 COLL VENOUS BLD VENIPUNCTURE: CPT

## 2024-08-13 RX ORDER — LIDOCAINE HYDROCHLORIDE 20 MG/ML
JELLY TOPICAL ONCE
Status: COMPLETED | OUTPATIENT
Start: 2024-08-13 | End: 2024-08-13

## 2024-08-13 RX ADMIN — LIDOCAINE HYDROCHLORIDE: 20 JELLY TOPICAL at 22:54

## 2024-08-13 RX ADMIN — PHENYLEPHRINE HYDROCHLORIDE 1 DROP: 0.5 SPRAY NASAL at 22:54

## 2024-08-13 ASSESSMENT — ACTIVITIES OF DAILY LIVING (ADL)
ADLS_ACUITY_SCORE: 33
ADLS_ACUITY_SCORE: 35

## 2024-08-13 ASSESSMENT — COLUMBIA-SUICIDE SEVERITY RATING SCALE - C-SSRS
6. HAVE YOU EVER DONE ANYTHING, STARTED TO DO ANYTHING, OR PREPARED TO DO ANYTHING TO END YOUR LIFE?: NO
1. IN THE PAST MONTH, HAVE YOU WISHED YOU WERE DEAD OR WISHED YOU COULD GO TO SLEEP AND NOT WAKE UP?: NO
1. IN THE PAST MONTH, HAVE YOU WISHED YOU WERE DEAD OR WISHED YOU COULD GO TO SLEEP AND NOT WAKE UP?: NO
2. HAVE YOU ACTUALLY HAD ANY THOUGHTS OF KILLING YOURSELF IN THE PAST MONTH?: NO
6. HAVE YOU EVER DONE ANYTHING, STARTED TO DO ANYTHING, OR PREPARED TO DO ANYTHING TO END YOUR LIFE?: NO
2. HAVE YOU ACTUALLY HAD ANY THOUGHTS OF KILLING YOURSELF IN THE PAST MONTH?: NO

## 2024-08-13 NOTE — ED TRIAGE NOTES
"ED Nursing Triage Note (General)   ________________________________    Willy Courtney is a 39 year old Male that presents to triage via private vehicle with complaints of epistaxis.  Patient was seen in the  yesterday for prolonged nose bleed and a rhino rocket was placed in the L) nare.  Patient states he declined blood work at that time, however, states now his R) nare is intermittently bleeding and states on arrival he can feel it draining in the back of his throat.  Patient denies injury, trauma, or recent surgery.  No hx of hypertension.  Significant symptoms had onset 1.5 hour(s) ago.  Vital signs:  Temp: 98.1  F (36.7  C) Temp src: Tympanic BP: (!) 153/74 Pulse: 78   Resp: 20 SpO2: 98 %     Height: 170.2 cm (5' 7\") Weight: 82.6 kg (182 lb)  Estimated body mass index is 28.51 kg/m  as calculated from the following:    Height as of this encounter: 1.702 m (5' 7\").    Weight as of this encounter: 82.6 kg (182 lb).  PRE HOSPITAL PRIOR LIVING SITUATION Spouse and Children      Triage Assessment (Adult)       Row Name 08/13/24 7629          Triage Assessment    Airway WDL WDL        Respiratory WDL    Respiratory WDL WDL        Skin Circulation/Temperature WDL    Skin Circulation/Temperature WDL WDL        Cardiac WDL    Cardiac WDL WDL     Cardiac Rhythm NSR        Peripheral/Neurovascular WDL    Peripheral Neurovascular WDL WDL        Cognitive/Neuro/Behavioral WDL    Cognitive/Neuro/Behavioral WDL WDL                     "

## 2024-08-13 NOTE — TELEPHONE ENCOUNTER
Pt states that he was seen in the [facility] yesterday for a bloody nose.  He was given a Rhino rocket.  He is still experiencing a bloody nose and he would like to know if this is normal.    Jah Gonzalez on 8/13/2024 at 12:37 PM

## 2024-08-13 NOTE — ED TRIAGE NOTES
"ED Nursing Triage Note (General)   ________________________________    Willy Courtney is a 39 year old Male that presents to triage via private vehicle  [facility] where they inserted a rhino rocket in his L) nare.  Patient states his nose began bleeding again last night and is now on the R) side of his nose.  Patient states bleeding has been intermittent with the last episode at noon.  Patient called the  and was told to come to the ER in the instance it needs to be cauterized.  Patient denies medication changes, no injury, or recent surgeries.  Patient states he refused blood work yesterday, however, states he is now concerned for the cause of bleeding and would like blood work done.   Significant symptoms had onset 24 hour(s) ago.  Vital signs:  Temp: 98.2  F (36.8  C) Temp src: Tympanic BP: 133/74 Pulse: 75   Resp: 20 SpO2: 97 %     Height: 170.2 cm (5' 7\") Weight: 82.6 kg (182 lb)  Estimated body mass index is 28.51 kg/m  as calculated from the following:    Height as of this encounter: 1.702 m (5' 7\").    Weight as of this encounter: 82.6 kg (182 lb).  PRE HOSPITAL PRIOR LIVING SITUATION Spouse      Triage Assessment (Adult)       Row Name 08/13/24 1331          Triage Assessment    Airway WDL WDL        Respiratory WDL    Respiratory WDL WDL        Skin Circulation/Temperature WDL    Skin Circulation/Temperature WDL WDL        Cardiac WDL    Cardiac WDL WDL     Cardiac Rhythm NSR        Peripheral/Neurovascular WDL    Peripheral Neurovascular WDL WDL        Cognitive/Neuro/Behavioral WDL    Cognitive/Neuro/Behavioral WDL WDL                     "

## 2024-08-13 NOTE — TELEPHONE ENCOUNTER
Patient was informed of provider's recommendations.     Mckayla Johnston LPN on 8/13/2024 at 1:07 PM

## 2024-08-14 ASSESSMENT — ACTIVITIES OF DAILY LIVING (ADL): ADLS_ACUITY_SCORE: 35

## 2024-08-14 NOTE — ED PROVIDER NOTES
"  History     Chief Complaint   Patient presents with    Epistaxis     HPI  Willy Courtney is a 39 year old male with a bloody nose.  He reports on and off bloody nose since Wednesday.  He reports he was running Wednesday when he got a bloody nose from his left nostril.  He reports Saturday while mowing his lawn he got another bloody nose.  Sunday the bleeding was prolonged and worsened into Monday.  [facility] and a Rhino Rocket was placed in the left nare.  He is on a prophylactic antibiotic.  He reports the Rhino Rocket is not working as he is bleeding from the right nostril. He can feel blood running down his throat. He is spitting out the blood that is running down the back of his throat.  He denies any recent head or nasal trauma.  He started taking an herbal supplement called Beberine 1000mg two weeks ago.  His daughter is currently sick with an upper respiratory infection.     Allergies:  Allergies   Allergen Reactions    Amoxicillin Unknown     Patient is unable to remember    Bactroban [Mupirocin]        Problem List:    There are no problems to display for this patient.       Past Medical History:    No past medical history on file.    Past Surgical History:    No past surgical history on file.    Family History:    No family history on file.    Social History:  Marital Status:   [2]  Social History     Tobacco Use    Smoking status: Never    Smokeless tobacco: Former     Types: Chew     Quit date: 1/1/2011   Vaping Use    Vaping status: Never Used   Substance Use Topics    Alcohol use: Yes     Comment: Alcoholic Drinks/day: moderate    Drug use: Never        Medications:    cephALEXin (KEFLEX) 500 MG capsule        Review of Systems   HENT:  Positive for nosebleeds.        Physical Exam   BP: (!) 153/74  Pulse: 78  Temp: 98.1  F (36.7  C)  Resp: 20  Height: 170.2 cm (5' 7\")  Weight: 82.6 kg (182 lb)  SpO2: 98 %      Physical Exam  Vitals and nursing note reviewed. "   Constitutional:       General: He is not in acute distress.  HENT:      Head: Normocephalic.      Nose:      Right Nostril: Epistaxis present.      Comments: Rhino rocket in left nare. Slow oozing noted coming from left nare. Intermittent oozing coming from right nare.      Mouth/Throat:      Lips: Pink.      Mouth: Mucous membranes are moist.   Cardiovascular:      Rate and Rhythm: Normal rate and regular rhythm.      Pulses: Normal pulses.      Heart sounds: Normal heart sounds.   Skin:     General: Skin is warm and dry.   Neurological:      General: No focal deficit present.      Mental Status: He is alert and oriented to person, place, and time. Mental status is at baseline.   Psychiatric:         Mood and Affect: Mood is anxious.            Results for orders placed or performed during the hospital encounter of 08/13/24 (from the past 24 hour(s))   CBC with platelets differential    Narrative    The following orders were created for panel order CBC with platelets differential.  Procedure                               Abnormality         Status                     ---------                               -----------         ------                     CBC with platelets and d...[[id number]]  Abnormal            Final result                 Please view results for these tests on the individual orders.   INR   Result Value Ref Range    INR 1.03 0.85 - 1.15   Partial thromboplastin time   Result Value Ref Range    aPTT 23 22 - 38 Seconds   Basic metabolic panel   Result Value Ref Range    Sodium 139 135 - 145 mmol/L    Potassium 4.0 3.4 - 5.3 mmol/L    Chloride 103 98 - 107 mmol/L    Carbon Dioxide (CO2) 28 22 - 29 mmol/L    Anion Gap 8 7 - 15 mmol/L    Urea Nitrogen 12.1 6.0 - 20.0 mg/dL    Creatinine 0.78 0.67 - 1.17 mg/dL    GFR Estimate >90 >60 mL/min/1.73m2    Calcium 9.4 8.8 - 10.4 mg/dL    Glucose 117 (H) 70 - 99 mg/dL   CBC with platelets and differential   Result Value Ref Range    WBC Count 7.1 4.0 - 11.0  "10e3/uL    RBC Count 3.72 (L) 4.40 - 5.90 10e6/uL    Hemoglobin 11.9 (L) 13.3 - 17.7 g/dL    Hematocrit 35.6 (L) 40.0 - 53.0 %    MCV 96 78 - 100 fL    MCH 32.0 26.5 - 33.0 pg    MCHC 33.4 31.5 - 36.5 g/dL    RDW 12.8 10.0 - 15.0 %    Platelet Count 231 150 - 450 10e3/uL    % Neutrophils 68 %    % Lymphocytes 25 %    % Monocytes 7 %    % Eosinophils 0 %    % Basophils 0 %    % Immature Granulocytes 0 %    NRBCs per 100 WBC 0 <1 /100    Absolute Neutrophils 4.8 1.6 - 8.3 10e3/uL    Absolute Lymphocytes 1.8 0.8 - 5.3 10e3/uL    Absolute Monocytes 0.5 0.0 - 1.3 10e3/uL    Absolute Eosinophils 0.0 0.0 - 0.7 10e3/uL    Absolute Basophils 0.0 0.0 - 0.2 10e3/uL    Absolute Immature Granulocytes 0.0 <=0.4 10e3/uL    Absolute NRBCs 0.0 10e3/uL       Medications   phenylephrine (KATIE-SYNEPHRINE) 0.5 % spray 1 drop (1 drop Nasal $Given 8/13/24 2254)   lidocaine (XYLOCAINE) 2 % external gel ( Topical $Given 8/13/24 2254)       Assessments & Plan (with Medical Decision Making)  Willy Courtney is a 39 year old male with a bloody nose.  He reports on and off bloody nose since Wednesday.  He reports he was running Wednesday when he got a bloody nose from his left nostril.  He reports Saturday while mowing his lawn he got another bloody nose.  Sunday the bleeding was prolonged and worsened into Monday.  [facility] and a Rhino Rocket was placed in the left nare.  He is on a prophylactic antibiotic.  He reports the Rhino Rocket is not working as he is bleeding from the right nostril. He can feel blood running down his throat. He is spitting out the blood that is running down the back of his throat.  He denies any recent head or nasal trauma.  He started taking an herbal supplement called Beberine 1000mg two weeks ago.  His daughter is currently sick with an upper respiratory infection.   VS in the ED. BP (!) 128/93   Pulse 89   Temp 98.1  F (36.7  C) (Tympanic)   Resp 20   Ht 1.702 m (5' 7\")   Wt 82.6 " kg (182 lb)   SpO2 99%   BMI 28.51 kg/m  Awake, alert, and conversant in no acute distress. HR regular. Slow oozing coming from right and left nare with rhino rocket in left nare. Blood noted to posterior oropharynx. Gave Afrin 2 sprays in each nostril.   Diagnostics:    Lab: CBC- Hgb- 11.9. CMP- okay. PTT- normal. INR- normal.     ED Course as of 08/14/24 0134   Tue Aug 13, 2024   2344 Rhino rocket removed from left nare. Afrin 2 sprays placed in each nare. He did spit out 2-3 small clots. Bleeding has stopped. No anterior bleeding visualized in right or left nare.    Wed Aug 14, 2024   0030 No bleeding noted in the oropharynx. No longer bleeding from his nares. He would like to go home.      Willy is a 39-year-old male evaluated today for bloody nose concerns.  He had a Rhino Rocket placed in his left nare [facility] in [facility].  He continued to have oozing from both of his nares and down his throat.  Hemoglobin is 11.1 today I suspect from the continued oozing.  Rhino Rocket was removed from his left nare without difficulty.  Patient then blew his nose and Afrin sprays were placed in each nare.  His nose was then clamped.  He did spit out 2-3 small clots after the Afrin.  After removing the clamp both nares were visualized with a nasal speculum.  No bleeding noted on exam.  He was observed for an hour and a half with no further bleeding.  He would like to go home.  An ENT referral was placed.  Afrin twice a day for the next 24-48 hours.  Nose clamp sent home with the patient.  Discussed avoid heavy lifting or running until he sees ENT.  Return to ED precautions discussed.      I have reviewed the nursing notes.    I have reviewed the findings, diagnosis, plan and need for follow up with the patient.  Medical Decision Making  The patient's presentation was of low complexity (an acute and uncomplicated illness or injury).    The patient's evaluation involved:  an assessment requiring an independent historian  (see separate area of note for details)  review of external note(s) from 1 sources (see separate area of note for details)  ordering and/or review of 3+ test(s) in this encounter (see separate area of note for details)    The patient's management necessitated moderate risk (prescription drug management including medications given in the ED).    Final diagnoses:   Epistaxis     8/13/2024   [facility] [place]       Kirstin Chamberlain, REN CNP  08/14/24 0134

## 2024-08-14 NOTE — DISCHARGE INSTRUCTIONS
I placed an ENT referral. Please call to schedule an appointment.     As discussed if your nose starts bleeding. Use the afrin and clamp your nose.     As discussed do not use the afrin for more then 3 days.     Continue your antibiotic.     Avoid heavy lifting, or running until you see ENT. Avoid blowing or picking at your nose.     Please return to the emergency room if your nose is bleeding and you are not able to get to stop, dizziness, vomiting, feeling lightheaded or any new or worsening symptoms.

## 2024-09-21 ENCOUNTER — HEALTH MAINTENANCE LETTER (OUTPATIENT)
Age: 39
End: 2024-09-21

## 2025-05-15 ENCOUNTER — OFFICE VISIT (OUTPATIENT)
Dept: FAMILY MEDICINE | Facility: OTHER | Age: 40
End: 2025-05-15
Attending: FAMILY MEDICINE
Payer: COMMERCIAL

## 2025-05-15 VITALS
WEIGHT: 189 LBS | OXYGEN SATURATION: 97 % | DIASTOLIC BLOOD PRESSURE: 68 MMHG | RESPIRATION RATE: 16 BRPM | SYSTOLIC BLOOD PRESSURE: 118 MMHG | BODY MASS INDEX: 29.6 KG/M2 | HEART RATE: 56 BPM | TEMPERATURE: 97.2 F

## 2025-05-15 DIAGNOSIS — K64.5 THROMBOSED EXTERNAL HEMORRHOIDS: Primary | ICD-10-CM

## 2025-05-15 PROCEDURE — 250N000009 HC RX 250: Performed by: FAMILY MEDICINE

## 2025-05-15 RX ORDER — LIDOCAINE HYDROCHLORIDE 10 MG/ML
10 INJECTION, SOLUTION INFILTRATION; PERINEURAL ONCE
Status: COMPLETED | OUTPATIENT
Start: 2025-05-15 | End: 2025-05-15

## 2025-05-15 RX ORDER — LIDOCAINE HYDROCHLORIDE 10 MG/ML
10 INJECTION, SOLUTION EPIDURAL; INFILTRATION; INTRACAUDAL; PERINEURAL ONCE
Status: CANCELLED | OUTPATIENT
Start: 2025-05-15 | End: 2025-05-15

## 2025-05-15 RX ADMIN — LIDOCAINE HYDROCHLORIDE 10 ML: 10 INJECTION, SOLUTION INFILTRATION; PERINEURAL at 10:33

## 2025-05-15 ASSESSMENT — ENCOUNTER SYMPTOMS
APPETITE CHANGE: 1
ROS GI COMMENTS: RECTAL PAIN
RECTAL PAIN: 1
RESPIRATORY NEGATIVE: 1
CARDIOVASCULAR NEGATIVE: 1

## 2025-05-15 ASSESSMENT — PAIN SCALES - GENERAL: PAINLEVEL_OUTOF10: NO PAIN (0)

## 2025-05-15 NOTE — NURSING NOTE
"Chief Complaint   Patient presents with    Rectal Problem     Patient presents with concerns of hemorrhoid that is continuously bleeding.  Denies pain at this time.      Initial /68 (BP Location: Right arm, Patient Position: Sitting, Cuff Size: Adult Large)   Pulse 56   Temp 97.2  F (36.2  C) (Temporal)   Resp 16   Wt 85.7 kg (189 lb)   SpO2 97%   BMI 29.60 kg/m   Estimated body mass index is 29.6 kg/m  as calculated from the following:    Height as of 8/13/24: 1.702 m (5' 7\").    Weight as of this encounter: 85.7 kg (189 lb).  Medication Review: complete    The next two questions are to help us understand your food security.  If you are feeling you need any assistance in this area, we have resources available to support you today.          8/12/2024   SDOH- Food Insecurity   Within the past 12 months, did you worry that your food would run out before you got money to buy more? N   Within the past 12 months, did the food you bought just not last and you didn t have money to get more? N        Data saved with a previous flowsheet row definition         Health Care Directive:  Patient does not have a Health Care Directive: Discussed advance care planning with patient; however, patient declined at this time.    Liza Garcia LPN on 5/15/2025 at 9:31 AM        "

## 2025-05-15 NOTE — PROGRESS NOTES
Assessment & Plan   Problem List Items Addressed This Visit    None  Visit Diagnoses         Thrombosed external hemorrhoids    -  Primary    Relevant Medications    lidocaine 1 % injection 10 mL (Completed)              Diagnoses and all orders for this visit:  Thrombosed external hemorrhoids  -     lidocaine 1 % injection 10 mL        I advised the patient that he should go home and do a sitz bath once or twice a day.  He can probably resume running as exercise in 4 days.  I discussed taking a fiber supplement and how to decrease intra-abdominal pressure while lifting to reduce likelihood of hemorrhoids.      Subjective   Willy Stoner A 39 year old male    Presents today with complaint that he has been having a hemorrhoid that has been bleeding since around 6 AM this morning.  He states he has 4-5 out of 10 pain associated with the hemorrhoid.  Is been there for the last 4 days.  He did have a history of nosebleeds that would not stop last summer.  He did have a workup with a normal PTT and PT/INR.  This is the first painful hemorrhoid this patient has had    History of Present Illness       Reason for visit:  Hemmorrhoid wont stop bleeding  Symptom onset:  1-3 days ago  Symptom intensity:  Moderate  Symptom progression:  Worsening   He is taking medications regularly.    History reviewed. No pertinent past medical history.   reports that he has never smoked. He quit smokeless tobacco use about 14 years ago.  His smokeless tobacco use included chew. He reports current alcohol use. He reports that he does not use drugs.  History reviewed. No pertinent family history.  Allergies   Allergen Reactions    Amoxicillin Unknown     Patient is unable to remember    Bactroban [Mupirocin]      No current outpatient medications on file.  No current facility-administered medications for this visit.  Review of Systems   Constitutional:  Positive for appetite change.   Respiratory: Negative.     Cardiovascular: Negative.     Gastrointestinal:  Positive for rectal pain.        Rectal pain         Objective      /68 (BP Location: Right arm, Patient Position: Sitting, Cuff Size: Adult Large)   Pulse 56   Temp 97.2  F (36.2  C) (Temporal)   Resp 16   Wt 85.7 kg (189 lb)   SpO2 97%   BMI 29.60 kg/m    Body mass index is 29.6 kg/m .  Physical Exam  Constitutional:       Appearance: Normal appearance. He is normal weight.   HENT:      Head: Normocephalic.   Pulmonary:      Effort: Pulmonary effort is normal.   Genitourinary:     Comments: Evaluation rectum shows a thrombosed hemorrhoid at approximately 9:00 with the patient on his left lateral recumbent position.  It has a smaller ulceration that is slowly weeping.  Neurological:      General: No focal deficit present.      Mental Status: He is alert and oriented to person, place, and time. Mental status is at baseline.   Psychiatric:         Mood and Affect: Mood normal.         Behavior: Behavior normal.         Thought Content: Thought content normal.         Judgment: Judgment normal.       Procedure: Hemorrhoid incision and drainage.  After discussing risks of bleeding and neurologic damage patient to have procedure performed.  I injected 1 cc of 1% lidocaine without epinephrine using a 27-gauge into the thrombosed hemorrhoid  I then used a #11 scalpel to make approximately 1 cm incision.  I removed a large thrombus.  The area was packed with cotton absorbent pad.  Patient tolerated seizure well.  Lab Results   Component Value Date    WBC 7.1 08/13/2024    HGB 11.9 (L) 08/13/2024     08/13/2024     08/13/2024    BUN 12.1 08/13/2024    CO2 28 08/13/2024       Admission on 08/13/2024, Discharged on 08/14/2024   Component Date Value Ref Range Status    INR 08/13/2024 1.03  0.85 - 1.15 Final    aPTT 08/13/2024 23  22 - 38 Seconds Final    Sodium 08/13/2024 139  135 - 145 mmol/L Final    Potassium 08/13/2024 4.0  3.4 - 5.3 mmol/L Final    Chloride 08/13/2024 103  98  - 107 mmol/L Final    Carbon Dioxide (CO2) 08/13/2024 28  22 - 29 mmol/L Final    Anion Gap 08/13/2024 8  7 - 15 mmol/L Final    Urea Nitrogen 08/13/2024 12.1  6.0 - 20.0 mg/dL Final    Creatinine 08/13/2024 0.78  0.67 - 1.17 mg/dL Final    GFR Estimate 08/13/2024 >90  >60 mL/min/1.73m2 Final    eGFR calculated using 2021 CKD-EPI equation.    Calcium 08/13/2024 9.4  8.8 - 10.4 mg/dL Final    Reference intervals for this test were updated on 7/16/2024 to reflect our healthy population more accurately. There may be differences in the flagging of prior results with similar values performed with this method. Those prior results can be interpreted in the context of the updated reference intervals.    Glucose 08/13/2024 117 (H)  70 - 99 mg/dL Final    WBC Count 08/13/2024 7.1  4.0 - 11.0 10e3/uL Final    RBC Count 08/13/2024 3.72 (L)  4.40 - 5.90 10e6/uL Final    Hemoglobin 08/13/2024 11.9 (L)  13.3 - 17.7 g/dL Final    Hematocrit 08/13/2024 35.6 (L)  40.0 - 53.0 % Final    MCV 08/13/2024 96  78 - 100 fL Final    MCH 08/13/2024 32.0  26.5 - 33.0 pg Final    MCHC 08/13/2024 33.4  31.5 - 36.5 g/dL Final    RDW 08/13/2024 12.8  10.0 - 15.0 % Final    Platelet Count 08/13/2024 231  150 - 450 10e3/uL Final    % Neutrophils 08/13/2024 68  % Final    % Lymphocytes 08/13/2024 25  % Final    % Monocytes 08/13/2024 7  % Final    % Eosinophils 08/13/2024 0  % Final    % Basophils 08/13/2024 0  % Final    % Immature Granulocytes 08/13/2024 0  % Final    NRBCs per 100 WBC 08/13/2024 0  <1 /100 Final    Absolute Neutrophils 08/13/2024 4.8  1.6 - 8.3 10e3/uL Final    Absolute Lymphocytes 08/13/2024 1.8  0.8 - 5.3 10e3/uL Final    Absolute Monocytes 08/13/2024 0.5  0.0 - 1.3 10e3/uL Final    Absolute Eosinophils 08/13/2024 0.0  0.0 - 0.7 10e3/uL Final    Absolute Basophils 08/13/2024 0.0  0.0 - 0.2 10e3/uL Final    Absolute Immature Granulocytes 08/13/2024 0.0  <=0.4 10e3/uL Final    Absolute NRBCs 08/13/2024 0.0  10e3/uL Final  "        No results for input(s): \"TSH\", \"UA\", \"PSA\", \"HGBA1C\" in the last 336 hours.    Invalid input(s): \"CBC\", \"CMP\", \"LIPIDS\", \"BMP\", \"MICROALBU\"         "

## (undated) RX ORDER — ONDANSETRON 4 MG/1
TABLET, ORALLY DISINTEGRATING ORAL
Status: DISPENSED
Start: 2024-08-12

## (undated) RX ORDER — LIDOCAINE HYDROCHLORIDE 20 MG/ML
JELLY TOPICAL
Status: DISPENSED
Start: 2024-08-13

## (undated) RX ORDER — LIDOCAINE HYDROCHLORIDE 10 MG/ML
INJECTION, SOLUTION INFILTRATION; PERINEURAL
Status: DISPENSED
Start: 2025-05-15

## (undated) RX ORDER — OXYMETAZOLINE HYDROCHLORIDE 0.05 G/100ML
SPRAY NASAL
Status: DISPENSED
Start: 2024-08-13